# Patient Record
Sex: MALE | Race: WHITE | NOT HISPANIC OR LATINO | Employment: OTHER | URBAN - METROPOLITAN AREA
[De-identification: names, ages, dates, MRNs, and addresses within clinical notes are randomized per-mention and may not be internally consistent; named-entity substitution may affect disease eponyms.]

---

## 2022-01-01 ENCOUNTER — TELEPHONE (OUTPATIENT)
Dept: VASCULAR SURGERY | Facility: CLINIC | Age: 67
End: 2022-01-01

## 2022-01-01 ENCOUNTER — VIRTUAL VISIT (OUTPATIENT)
Dept: RADIOLOGY | Facility: CLINIC | Age: 67
End: 2022-01-01
Attending: RADIOLOGY
Payer: COMMERCIAL

## 2022-01-01 ENCOUNTER — DOCUMENTATION ONLY (OUTPATIENT)
Dept: TRANSPLANT | Facility: CLINIC | Age: 67
End: 2022-01-01
Payer: COMMERCIAL

## 2022-01-01 ENCOUNTER — PRE VISIT (OUTPATIENT)
Dept: RADIOLOGY | Facility: CLINIC | Age: 67
End: 2022-01-01
Payer: COMMERCIAL

## 2022-01-01 ENCOUNTER — TRANSCRIBE ORDERS (OUTPATIENT)
Dept: GASTROENTEROLOGY | Facility: CLINIC | Age: 67
End: 2022-01-01
Payer: COMMERCIAL

## 2022-01-01 ENCOUNTER — TELEPHONE (OUTPATIENT)
Dept: RADIOLOGY | Facility: CLINIC | Age: 67
End: 2022-01-01

## 2022-01-01 DIAGNOSIS — C22.0 HCC (HEPATOCELLULAR CARCINOMA) (H): Primary | ICD-10-CM

## 2022-01-01 DIAGNOSIS — C22.0 HEPATOCELLULAR CARCINOMA (H): Primary | ICD-10-CM

## 2022-01-01 PROCEDURE — G0463 HOSPITAL OUTPT CLINIC VISIT: HCPCS | Mod: PN,RTG | Performed by: RADIOLOGY

## 2022-01-01 PROCEDURE — 99204 OFFICE O/P NEW MOD 45 MIN: CPT | Mod: 95 | Performed by: RADIOLOGY

## 2022-06-10 NOTE — PROGRESS NOTES
6/6/22 MRI from [facility]  1.) HCC tumor thrombus seen  2.) 1.2 cm 5A      Recs:  - systemic treatment - possible before or after IR treatment  - NOT a transplant candidate

## 2022-06-20 NOTE — PROGRESS NOTES
Video-Visit Details    Type of service:  Phone Visit    Start Time: 1241    End Time: 1251    Originating Location (pt. Location): Home    Distant Location (provider location):  [facility] [place]     Platform used for Video Visit: Well      Interventional Radiology Clinic Visit    Date of this visit: 6/20/2022    Primary Physician: No primary care provider on file.        History Of Present Illness:    Karan Grider is a 67 year old male who presents with diagnosis of HCC on a background of HepC. Patient reports that he was treated with Hep C. He finds out that he has HCC 3 weeks ago during his routine ultrasound screening. He is a highly functioning individual and is currently working as a . Pt is feeling tired from time to time but otherwise doing good. No jaundice, RUQ pain, or ascites.       Review of Systems:    General: Negative for recent fever.  Skin: Negative for jaundice.  Eyes: Negative for jaundice.  Respiratory: Negative for shortness of breath.  Cardiovascular: Negative for chest pain.  Gastrointestinal: Negative for abdominal pain or swelling, nausea, vomiting, or diarrhea.  Musculoskeletal: Negative for ankle swelling.    Past Medical/Surgical History:    No past medical history on file.  No past surgical history on file.    Current Medications:    No current outpatient medications on file.       Allergies:    Patient has no allergy information on record.    Family History:    No family history on file.    Social History:       Physical Exam:    There were no vitals taken for this visit.     GENERAL APPEARANCE: healthy, alert and no distress  PSYCHIATRIC: mentation appears normal and affect normal.      Laboratory Studies:    5/24/2022:   Na 139  Cr 0.74  Albumin 3.4  AST/ALT: 59/51  T bili: 0.8  PLT: 105  INR 1.2     Imaging:     I reviewed the MRI 6/6/2022, bone scan report 6/13/2022 (No scintigraphic evidence of osseous metastatic disease.), and CT CAP 6/13/2022  (infiltrative left lobe HCC with intrahepatic tumoral portal vein thrombosis. Indetermine 5 mm nodule in the left upper lobe).       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     Child-Sanchez score: 6 (A)  Native MELD score: 8  ECOG performance status: 0    I believe the patient is a suitable candidate for a radioembolization procedure. There is a small chance that we will not able to perform Y90 delivery if the lung shunt fraction is large.     I  discussed the findings. I discussed with the patient that the goal of the procedure is disease control with a survival benefit rather than a cure given the nature of the disease. Alternatives were reviewed, including surgery, but the patient is not a surgical candidate.    I explained the radioembolization procedure  - that it is a two step procedure on two different days that involves an angiogram and nuclear medicine study on each day, and that it requires insurance pre-approval. I explained that the first procedure involves mapping the arteries to the liver and embolizing any sidebranches that place the patient at risk of non-target radiation injury, then checking for shunting to the lungs. The second procedure, assuming relevant sidebranches were embolizable and the lung shunting is not too high, involves delivering the radiation beads intra-arterially and imaging the liver afterwards to assess the pattern of radiation distribution.     I explained that both procedures are performed under conscious sedation. We discussed what will happen before, during and after the procedure; what to expect in the post procedure recovery period; and what the follow-up will be. We discussed the risks of the procedure which include, but are not limited to, vascular injury causing bleeding or occlusion of blood vessels, groin hematoma, infection, biloma, liver  failure, non-target radiation injury to structures such as gallbladder/bowel/pancreas/lung (with risks such as bowel ulceration, pancreatitis or pneumonitis), and incomplete treatment. Risks are increased the greater the deviation from normal lab values, especially albumin and total bilirubin, and also the larger the area we must treat. We also discussed the post-radioembolization syndrome which consists of varying degrees of pain, nausea, and lethargy. I also explained that new tumors may develop elsewhere given the nature of the disease. Most patients go home the same day, but occasionally circumstances are such that a longer admission may be required. I also informed the patient that I will be assisted during the procedure by a fellow and/or resident, and that sometimes a medical student may be observing.      PLAN:  Pending insurance pre-approval, we will schedule for the Y90 mapping procedure accordingly.      It was a pleasure seeing the patient.       Jessica Perry M.D. (PGY-6)  Department of Interventional Radiology  [facility]       I have seen the patient with Dr. Perry (resident) virtually and agree with the note.      CC  No care team member to display  MARIA DEL ROSARIO BEST

## 2022-06-20 NOTE — TELEPHONE ENCOUNTER
DIAGNOSIS: Consult for liver directed therapy.  Pawan sutton.   DATE RECEIVED: 6.21.22   NOTES STATUS DETAILS   OFFICE NOTE from referring provider Internal 6.10.22  Pawan  Hepatology   OFFICE NOTE from other specialist CE 5.23.22, 11.11.21  Adalid  [facility]   MEDICATION LIST CE    XRAYS (IMAGES & REPORTS) Pacs 6.13.22  CT Chest/Abd/Pelvis    6.6.22  MR Abd    5.24.22   [employer] [employer]

## 2022-06-21 NOTE — PROGRESS NOTES
Karan is a 67 year old who is being evaluated via a billable video visit.      How would you like to obtain your AVS? MyChart  If the video visit is dropped, the invitation should be resent by: Text to cell phone: [phone]  Will anyone else be joining your video visit? Italia Morejon

## 2022-06-21 NOTE — LETTER
6/21/2022         RE: Karan Grider  [address]  [facility] [address]        Dear Colleague,    Thank you for referring your patient, Karan Grider, to the [facility]. Please see a copy of my visit note below.      Video-Visit Details    Type of service:  Phone Visit    Start Time: 1241    End Time: 1251    Originating Location (pt. Location): Home    Distant Location (provider location):  [facility] [place]     Platform used for Video Visit: [employer]      Interventional Radiology Clinic Visit    Date of this visit: 6/20/2022    Primary Physician: No primary care provider on file.        History Of Present Illness:    Karan Grider is a 67 year old male who presents with diagnosis of HCC on a background of HepC. Patient reports that he was treated with Hep C. He finds out that he has HCC 3 weeks ago during his routine ultrasound screening. He is a highly functioning individual and is currently working as a . Pt is feeling tired from time to time but otherwise doing good. No jaundice, RUQ pain, or ascites.       Review of Systems:    General: Negative for recent fever.  Skin: Negative for jaundice.  Eyes: Negative for jaundice.  Respiratory: Negative for shortness of breath.  Cardiovascular: Negative for chest pain.  Gastrointestinal: Negative for abdominal pain or swelling, nausea, vomiting, or diarrhea.  Musculoskeletal: Negative for ankle swelling.    Past Medical/Surgical History:    No past medical history on file.  No past surgical history on file.    Current Medications:    No current outpatient medications on file.       Allergies:    Patient has no allergy information on record.    Family History:    No family history on file.    Social History:       Physical Exam:    There were no vitals taken for this visit.     GENERAL APPEARANCE: healthy, alert and no distress  PSYCHIATRIC: mentation appears normal and affect normal.      Laboratory  Studies:    5/24/2022:   Na 139  Cr 0.74  Albumin 3.4  AST/ALT: 59/51  T bili: 0.8  PLT: 105  INR 1.2     Imaging:     I reviewed the MRI 6/6/2022, bone scan report 6/13/2022 (No scintigraphic evidence of osseous metastatic disease.), and CT CAP 6/13/2022 (infiltrative left lobe HCC with intrahepatic tumoral portal vein thrombosis. Indetermine 5 mm nodule in the left upper lobe).       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     Child-Sanchez score: 6 (A)  Native MELD score: 8  ECOG performance status: 0    I believe the patient is a suitable candidate for a radioembolization procedure. There is a small chance that we will not able to perform Y90 delivery if the lung shunt fraction is large.     I  discussed the findings. I discussed with the patient that the goal of the procedure is disease control with a survival benefit rather than a cure given the nature of the disease. Alternatives were reviewed, including surgery, but the patient is not a surgical candidate.    I explained the radioembolization procedure  - that it is a two step procedure on two different days that involves an angiogram and nuclear medicine study on each day, and that it requires insurance pre-approval. I explained that the first procedure involves mapping the arteries to the liver and embolizing any sidebranches that place the patient at risk of non-target radiation injury, then checking for shunting to the lungs. The second procedure, assuming relevant sidebranches were embolizable and the lung shunting is not too high, involves delivering the radiation beads intra-arterially and imaging the liver afterwards to assess the pattern of radiation distribution.     I explained that both procedures are performed under conscious sedation. We discussed what will happen before, during and after the procedure; what to  expect in the post procedure recovery period; and what the follow-up will be. We discussed the risks of the procedure which include, but are not limited to, vascular injury causing bleeding or occlusion of blood vessels, groin hematoma, infection, biloma, liver failure, non-target radiation injury to structures such as gallbladder/bowel/pancreas/lung (with risks such as bowel ulceration, pancreatitis or pneumonitis), and incomplete treatment. Risks are increased the greater the deviation from normal lab values, especially albumin and total bilirubin, and also the larger the area we must treat. We also discussed the post-radioembolization syndrome which consists of varying degrees of pain, nausea, and lethargy. I also explained that new tumors may develop elsewhere given the nature of the disease. Most patients go home the same day, but occasionally circumstances are such that a longer admission may be required. I also informed the patient that I will be assisted during the procedure by a fellow and/or resident, and that sometimes a medical student may be observing.      PLAN:  Pending insurance pre-approval, we will schedule for the Y90 mapping procedure accordingly.      It was a pleasure seeing the patient.       Jessica Perry M.D. (PGY-6)  Department of Interventional Radiology  [facility]       I have seen the patient with Dr. Perry (resident) virtually and agree with the note.      Karan is a 67 year old who is being evaluated via a billable video visit.      How would you like to obtain your AVS? MyChart  If the video visit is dropped, the invitation should be resent by: Text to cell phone: [phone]  Will anyone else be joining your video visit? Italia Morejon            Again, thank you for allowing me to participate in the care of your patient.      Sincerely,    Ila Romano MD

## 2022-06-22 NOTE — PATIENT INSTRUCTIONS
Karan you have had your consult with Dr Romano regarding liver treatment for your HCC.     Plan:    We will submit for insurance prior authorization for y90 treatment discussed.  You have given verbal consent to submit for prior authorization and we will send you the consent form to return.      We will contact you with scheduling your procedure, please call with any questions.    Thank you,     NATASHA Cortes RN, BSN  Interventional Radiology Nurse Coordinator   Phone:  [phone]

## 2022-06-23 NOTE — TELEPHONE ENCOUNTER
Called and spoke with Pt in regards to Y90 consent.  He noted he can't do via email.   He asked consent be mailed and he would have his sister fax back.  Consent placed in mail.     Doreen Najera LPN

## 2022-07-06 NOTE — TELEPHONE ENCOUNTER
I called and spoke with Karan. After message from Dr Villalta, pt has [employer] insurance and Dr Romano's liver directed therapy would be out of network.  Pt will seek care in network.  NATASHA Cortes RN, BSN  Interventional Radiology Nurse Coordinator   Phone:  [phone]